# Patient Record
Sex: MALE | Race: WHITE | NOT HISPANIC OR LATINO | Employment: FULL TIME | URBAN - METROPOLITAN AREA
[De-identification: names, ages, dates, MRNs, and addresses within clinical notes are randomized per-mention and may not be internally consistent; named-entity substitution may affect disease eponyms.]

---

## 2021-12-01 ENCOUNTER — OFFICE VISIT (OUTPATIENT)
Dept: URGENT CARE | Facility: PHYSICIAN GROUP | Age: 46
End: 2021-12-01

## 2021-12-01 VITALS
DIASTOLIC BLOOD PRESSURE: 84 MMHG | TEMPERATURE: 98.6 F | RESPIRATION RATE: 16 BRPM | HEIGHT: 67 IN | WEIGHT: 164 LBS | OXYGEN SATURATION: 99 % | HEART RATE: 66 BPM | BODY MASS INDEX: 25.74 KG/M2 | SYSTOLIC BLOOD PRESSURE: 138 MMHG

## 2021-12-01 DIAGNOSIS — Z02.4 ENCOUNTER FOR DEPARTMENT OF TRANSPORTATION (DOT) EXAMINATION FOR DRIVING LICENSE RENEWAL: ICD-10-CM

## 2021-12-01 PROCEDURE — 7100 PR DOT PHYSICAL: Performed by: PHYSICIAN ASSISTANT

## 2021-12-01 NOTE — PROGRESS NOTES
Chief Complaint   Patient presents with   • Employment Physical     DOT renewal     Marcin Dupree is a 46 y.o. 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  Patient has hypertension for which he takes lisinopril.  No recent changes to the medication or dosage.  Patient also has a history of kidney surgery as a child with stable stage III chronic kidney disease.  He last saw his nephrologist 3 years ago.  Physical exam shows some scars on his right flank/back from his kidney surgeries.  Exam is otherwise normal.  With patient's hypertension he was certified for 1 year wearing corrective lenses.  Forms were completed and scanned into patient's chart.  See scanned forms under media tab.

## 2022-12-21 ENCOUNTER — OFFICE VISIT (OUTPATIENT)
Dept: URGENT CARE | Facility: PHYSICIAN GROUP | Age: 47
End: 2022-12-21

## 2022-12-21 VITALS
RESPIRATION RATE: 18 BRPM | BODY MASS INDEX: 24.33 KG/M2 | DIASTOLIC BLOOD PRESSURE: 68 MMHG | SYSTOLIC BLOOD PRESSURE: 116 MMHG | WEIGHT: 155 LBS | HEIGHT: 67 IN | HEART RATE: 77 BPM | TEMPERATURE: 97.8 F | OXYGEN SATURATION: 97 %

## 2022-12-21 DIAGNOSIS — Z02.4 ENCOUNTER FOR COMMERCIAL DRIVER MEDICAL EXAMINATION (CDME): ICD-10-CM

## 2022-12-21 PROCEDURE — 7100 PR DOT PHYSICAL: Performed by: NURSE PRACTITIONER

## 2022-12-22 NOTE — PROGRESS NOTES
CC) Here today for [employer] employment physical exam.   S) Reviewed History with pt.        No complaints    O) See medical exam forms and diagnostics attached to visit today.       A)   1. Encounter for  medical examination (CDME)              P) as documented on medical exam forms. Qualified medical exam expires on 12/21/23  Needs periodic evaluation for HTN  Wearing corrective lenses.

## 2023-12-06 ENCOUNTER — OFFICE VISIT (OUTPATIENT)
Dept: URGENT CARE | Facility: PHYSICIAN GROUP | Age: 48
End: 2023-12-06

## 2023-12-06 VITALS
DIASTOLIC BLOOD PRESSURE: 72 MMHG | WEIGHT: 172 LBS | HEIGHT: 67 IN | RESPIRATION RATE: 15 BRPM | HEART RATE: 76 BPM | SYSTOLIC BLOOD PRESSURE: 118 MMHG | TEMPERATURE: 98.2 F | BODY MASS INDEX: 27 KG/M2 | OXYGEN SATURATION: 98 %

## 2023-12-06 DIAGNOSIS — Z02.4 ENCOUNTER FOR DEPARTMENT OF TRANSPORTATION (DOT) EXAMINATION FOR DRIVING LICENSE RENEWAL: ICD-10-CM

## 2023-12-06 PROCEDURE — 7100 PR DOT PHYSICAL: Performed by: PHYSICIAN ASSISTANT

## 2023-12-07 NOTE — PROGRESS NOTES
"Subjective:   Marcin Dupree is a 48 y.o. male who presents for Employment Physical (DOT Physical)      HPI  For complete documentation please see DOT physical form scanned into chart          Past Medical History:   Diagnosis Date    Bilateral hydronephrosis     Proteinuria      No Known Allergies     Objective:     /72   Pulse 76   Temp 36.8 °C (98.2 °F) (Temporal)   Resp 15   Ht 1.702 m (5' 7\")   Wt 78 kg (172 lb)   SpO2 98%   BMI 26.94 kg/m²     Physical Exam      Small reducible umbilical hernia. No tenderness. No skin changes.       Diagnosis and associated orders:     1. Encounter for Department of Transportation (DOT) examination for driving license renewal       Comments/MDM:     For complete documentation please see DOT physical form scanned into chart  Medical clearance 12-6-23 to 12-6-24 due to history of Hypertension.            This note was electronically signed by Wilbert Wheeler PA-C    "

## 2024-12-03 ENCOUNTER — OFFICE VISIT (OUTPATIENT)
Dept: URGENT CARE | Facility: PHYSICIAN GROUP | Age: 49
End: 2024-12-03

## 2024-12-03 VITALS
WEIGHT: 165 LBS | HEART RATE: 68 BPM | SYSTOLIC BLOOD PRESSURE: 116 MMHG | BODY MASS INDEX: 25.9 KG/M2 | HEIGHT: 67 IN | RESPIRATION RATE: 16 BRPM | OXYGEN SATURATION: 98 % | TEMPERATURE: 98 F | DIASTOLIC BLOOD PRESSURE: 64 MMHG

## 2024-12-03 DIAGNOSIS — Z02.89 ENCOUNTER FOR EXAMINATION REQUIRED BY DEPARTMENT OF TRANSPORTATION (DOT): ICD-10-CM

## 2024-12-03 PROCEDURE — 7100 PR DOT PHYSICAL: Performed by: NURSE PRACTITIONER

## 2024-12-03 RX ORDER — SODIUM BICARBONATE 325 MG/1
325 TABLET ORAL 4 TIMES DAILY
COMMUNITY

## 2024-12-04 NOTE — PROGRESS NOTES
Patient denies any history of seizures, dialysis, insulin use, pacemaker/defibrillator, syncope, arrhythmias/murmurs, vertigo/meniere's disease, illicit drug use, regular sedating medication use, ETOH abuse, or any weakness/paresthesias to extremities.      Renew: Yes    Cleared:  1 year - HTN controlled     Corrective lenses: Yes    Current Outpatient Medications on File Prior to Visit   Medication Sig Dispense Refill    sodium bicarbonate 325 MG Tab Take 325 mg by mouth 4 times a day.      lisinopril (PRINIVIL) 20 MG TABS Take 20 mg by mouth every day.       No current facility-administered medications on file prior to visit.      Small reducible nontender umbilical hernia.  Will not affect patient's ability to drive.    See scanned history, physical and clearance